# Patient Record
(demographics unavailable — no encounter records)

---

## 2024-10-21 NOTE — HISTORY OF PRESENT ILLNESS
[de-identified] : Ear Pain [FreeTextEntry6] : b/l, started last night. Fever to 101. Does have a runny nose and cough.

## 2024-10-21 NOTE — DISCUSSION/SUMMARY
[FreeTextEntry1] : 5 year F with bilateral AOM requiring antibiotics given degree of discomfort. Discussed importance of completing antibiotic course. Potential side effect of antibiotics includes but not limited to diarrhea. Provide ibuprofen as needed for pain or fever. If no improvement within 48 hours return for re-evaluation. Follow up in 2-4 wks for ear check.

## 2024-11-01 NOTE — PHYSICAL EXAM
[General Appearance - Well Developed] : interactive [General Appearance - Well-Appearing] : well appearing [General Appearance - In No Acute Distress] : in no acute distress [Sclera] : the conjunctiva were normal [Outer Ear] : the ears and nose were normal in appearance [Examination Of The Oral Cavity] : mucous membranes were moist and pink [Normal Appearance] : was normal in appearance [Neck Supple] : was supple [Respiration, Rhythm And Depth] : normal respiratory rhythm and effort [Auscultation Breath Sounds / Voice Sounds] : clear bilateral breath sounds [Heart Rate And Rhythm] : heart rate and rhythm were normal [Heart Sounds] : normal S1 and S2 [Murmurs] : no murmurs [Bowel Sounds] : normal bowel sounds [Abdomen Soft] : soft [Abdomen Tenderness] : non-tender [Abdominal Distention] : nondistended [] : no hepato-splenomegaly [Musculoskeletal Exam: Normal Movement Of All Extremities] : normal movements of all extremities [No Visual Abnormalities] : no visible abnormailities [Motor Tone] : normal muscle strength and tone [Generalized Lymph Node Enlargement] : no lymphadenopathy [Normal] : normal texture and mobility [Initial Inspection: Infant Active And Alert] : active and alert [FreeTextEntry1] : serous effusions b/l [Enlarged Diffusely] : was not enlarged [Abnormal Color] : normal color and pigmentation [Skin Lesions 1] : no skin lesions were observed [Skin Turgor Decreased] : normal skin turgor

## 2024-11-01 NOTE — HISTORY OF PRESENT ILLNESS
[Preoperative Visit] : for a medical evaluation prior to surgery [Prior Anesthesia] : Prior anesthesia [Good] : Good [Fever] : no fever [Runny Nose] : no runny nose [Earache] : no earache [Sore Throat] : no sore throat [Cough] : no cough [Vomiting] : no vomiting [Abdominal Pain] : no abdominal pain [Diarrhea] : no diarrhea [Rash] : no rash [Prev Anesthesia Reaction] : no previous anesthesia reaction [Diabetes] : no diabetes [Pulmonary Disease] : no pulmonary disease [Renal Disease] : no renal disease [GI Disease] : no gastrointestinal disease [Sleep Apnea] : no sleep apnea [Transfusion Reaction] : no transfusion reaction [Impaired Immunity] : no impaired immunity [Frequent use of NSAIDs] : no use of NSAIDs [Anesthesia Reaction] : no anesthesia reaction [Clotting Disorder] : no clotting disorder [Bleeding Disorder] : no bleeding disorder [Sudden Death] : no sudden death [FreeTextEntry2] : 11/6/24 [de-identified] : Dr. Pederson [FreeTextEntry1] : Seen 1.5 weeks ago for AOM. Completed amox, back to normal now. No recent illness.

## 2024-11-11 NOTE — PHYSICAL EXAM
[Clear Rhinorrhea] : clear rhinorrhea [Bleeding] : no bleeding [NL] : warm, clear [FreeTextEntry3] : L TM obscured by crusted blood. R TM appears wnl with tube intact. [FreeTextEntry7] : rhonchi throughout. frequent dry cough. no tachypnea.

## 2024-11-11 NOTE — HISTORY OF PRESENT ILLNESS
Problem: Safety  Goal: Will remain free from injury  Outcome: PROGRESSING AS EXPECTED   Safety precautions in place.   Problem: Pain Management  Goal: Pain level will decrease to patient's comfort goal  Outcome: PROGRESSING AS EXPECTED  Pt pain is managed at him comfort level.       [de-identified] : Cough and Fever [FreeTextEntry6] : x 2-3 days. Runny nose. Appetite is okay. Not feeling well, took a 1.5 hour nap yesterday which isn't like her. Droopy eyes. Cough is dry, not interrupting her sleep. No labored breathing. Tmax in the 100s. POD 5 from adenoidectomy and BMT. No ear discharge. I have reviewed and agree with all pertinent clinical information, including history and physical exam and agree with treatment plan of the PA.

## 2024-11-11 NOTE — DISCUSSION/SUMMARY
[FreeTextEntry1] : 4 yo F with cough, fever, and rhonchi on exam c/w mycoplasma infection. Current high rate of community spread. Doubt post-op related, as illness very c/w mycoplasma. Treat with azithromycin and OTC cough meds. RTC if not improved in 2-3 days, worsening or other new symptoms.  Nasal swabs deferred as POD 5 from adenoidectomy.

## 2024-11-14 NOTE — PHYSICAL EXAM
[NL] : warm, clear [FreeTextEntry1] : higher energy than earlier this week [FreeTextEntry7] : scattered rhonchi, overall improved from earlier this week. scattered end-exp wheezing. frequent dry cough, worse with taking deep breaths. good a/e.

## 2024-11-14 NOTE — DISCUSSION/SUMMARY
[FreeTextEntry1] : 4 yo F with presumed mycoplasma bronchitis. Still febrile but overall improving on azithromycin. Given wheezing, tried a dose of albuterol in office; wheezing resolved, and Pia was able to take deeper breaths and was coughing less. Rec using q4h prn for her cough. If fever not resolved in another 2 days or if anything is worsening would consider adding on amox.  Provided spacer for MDI in the office and demo'ed how to use it.

## 2025-01-03 NOTE — DISCUSSION/SUMMARY
[] : The components of the vaccine(s) to be administered today are listed in the plan of care. The disease(s) for which the vaccine(s) are intended to prevent and the risks have been discussed with the caretaker.  The risks are also included in the appropriate vaccination information statements which have been provided to the patient's caregiver.  The caregiver has given consent to vaccinate. [FreeTextEntry1] :  Well 6 year F  Continue balanced diet with all food groups. Brush teeth twice a day with toothbrush. Recommend visit to dentist. Help child to maintain consistent daily routines and sleep schedule. School discussed. Ensure home is safe. Teach child about personal safety. Use consistent, positive discipline. Limit screen time to no more than 2 hours per day. Encourage physical activity. Child needs to ride in a belt-positioning booster seat until  4 feet 9 inches has been reached and are between 8 and 12 years of age.   -Imms: Flu -Labs: None needed -Good interval growth. -Next well visit in 1 year.

## 2025-01-03 NOTE — HISTORY OF PRESENT ILLNESS
[Father] : father [Fruit] : fruit [Vegetables] : vegetables [Meat] : meat [Grains] : grains [Eggs] : eggs [Fish] : fish [Dairy] : dairy [Normal] : Normal [In own bed] : In own bed [Brushing teeth] : Brushing teeth [Yes] : Patient goes to dentist yearly [Toothpaste] : Primary Fluoride Source: Toothpaste [Playtime (60 min/d)] : Playtime 60 min a day [Appropiate parent-child-sibling interaction] : Appropriate parent-child-sibling interaction [Parent has appropriate responses to behavior] : Parent has appropriate responses to behavior [Grade ___] : Grade [unfilled] [No difficulties with Homework] : No difficulties with homework [Adequate performance] : Adequate performance [Adequate attention] : Adequate attention [Child Cooperates] : Child cooperates [FreeTextEntry7] : Cough resolved [de-identified] : None [de-identified] : somewhat picky [de-identified] : PS 78

## 2025-03-31 NOTE — HISTORY OF PRESENT ILLNESS
[de-identified] : Arline is a 5 y/o here for follow up s/p BMT adenoids 11/6/25  S/p tonsillectomy and adenoidectomy in June 2021- severe POORNIMA (AHI 28.1/hr) No recent ear infections  No otorrhea  Occasional right ear itching Hearing improved No chronic nasal congestion  No snoring

## 2025-03-31 NOTE — REASON FOR VISIT
[Subsequent Evaluation] : a subsequent evaluation for [Mother] : mother [Hearing Loss] : hearing loss [FreeTextEntry2] : ETD

## 2025-03-31 NOTE — CONSULT LETTER
[Dear  ___] : Dear  [unfilled], [Courtesy Letter:] : I had the pleasure of seeing your patient, [unfilled], in my office today. [Please see my note below.] : Please see my note below. [Consult Closing:] : Thank you very much for allowing me to participate in the care of this patient.  If you have any questions, please do not hesitate to contact me. [Sincerely,] : Sincerely, [FreeTextEntry2] : Dr. Rose Villalobos [address] [place] [address]   [phone] [FreeTextEntry3] : Kong Pederson MD Chief, Pediatric Otolaryngology [facility] and Arline Alves [facility] Professor of Otolaryngology [facility] [employer] at [facility]

## 2025-03-31 NOTE — PHYSICAL EXAM
[Clear/Ventilated] : middle ear clear and well ventilated [Surgically Absent] : surgically absent [Increased Work of Breathing] : no increased work of breathing with use of accessory muscles and retractions [Normal Gait and Station] : normal gait and station [Normal muscle strength, symmetry and tone of facial, head and neck musculature] : normal muscle strength, symmetry and tone of facial, head and neck musculature [Normal] : no cervical lymphadenopathy [de-identified] : PET clogged

## 2025-03-31 NOTE — DATA REVIEWED
[FreeTextEntry1] : Audiogram 3-: Hearing within normal limits right ear. Left ear slight HL versus borderline normal.

## 2025-04-30 NOTE — DISCUSSION/SUMMARY
[FreeTextEntry1] : --- Arline is a 6 year old female with seasonal allergic rhinitis and now allergic conjunctivitis and post-nasal drip.  - Discussed OTC antihistamines: Zyrtec, Flonase, and Zaditor (or Alaway or Pataday). - Discussed strategies for reducing allergen load at home. - Discussed return precautions. - Father acknowledged understanding plan and demonstrated return.

## 2025-04-30 NOTE — HISTORY OF PRESENT ILLNESS
[de-identified] : seasonal allergies [FreeTextEntry6] : Father reports that Arline has been having very bad seasonal allergies this year, especially for the past 4-5 days. He reports she has been having lots of nasal congestion, rhinorrhea, sneezing, itchy eyes. He notes having given her OTC claritin, but switched to Zyrtec 1-2 days ago. He also notes trying Zaditor eye drops this morning with a small improvement in redness. Afebrile. Eating/drinking well.

## 2025-04-30 NOTE — PHYSICAL EXAM
[Alert] : alert [EOMI] : grossly EOMI [Conjuctival Injection] : conjunctival injection [Eyelid Swelling] : eyelid swelling [Allergic Shiners] : allergic shiners [Clear Rhinorrhea] : clear rhinorrhea [Inflamed Nasal Mucosa] : inflamed nasal mucosa [Cobblestoning] : cobblestoning of posterior pharynx [Clear to Auscultation Bilaterally] : clear to auscultation bilaterally [Regular Rate and Rhythm] : regular rate and rhythm [Normal S1, S2 audible] : normal S1, S2 audible [Warm] : warm [Clear] : clear [Acute Distress] : no acute distress [Tired appearing] : not tired appearing [Discharge] : no discharge [Wheezing] : no wheezing [Rales] : no rales [Crackles] : no crackles [Transmitted Upper Airway Sounds] : no transmitted upper airway sounds [Tachypnea] : no tachypnea [Rhonchi] : no rhonchi [Belly Breathing] : no belly breathing [Subcostal Retractions] : no subcostal retractions [Suprasternal Retractions] : no suprasternal retractions

## 2025-05-22 NOTE — REASON FOR VISIT
[Initial Consultation] : an initial consultation for [Father] : father [FreeTextEntry3] : allergies

## 2025-05-22 NOTE — HISTORY OF PRESENT ILLNESS
[de-identified] : Patient with sneezing - puffy eyes - itchy eyes - stuffy nose - mild coughing - no associated wheezing or SOB - this season has been much worse - milder in the past - treated with Zyrtec - Zaditor eye drops - Flonase BID.      Father reports use of albuterol - not given this spring - father is not certain.     The remainder of the year she does well.

## 2025-05-22 NOTE — PHYSICAL EXAM
[Alert] : alert [Well Nourished] : well nourished [No Acute Distress] : no acute distress [Well Developed] : well developed [Normal Voice/Communication] : normal voice communication [Conjunctival Erythema] : no conjunctival erythema [Normal Nasal Mucosa] : the nasal mucosa was normal [No Neck Mass] : no neck mass was observed [No LAD] : no lymphadenopathy [Normal Rate and Effort] : normal respiratory rhythm and effort [No Crackles] : no crackles [Normal Rate] : heart rate was normal  [Normal S1, S2] : normal S1 and S2 [Regular Rhythm] : with a regular rhythm [Normal Cervical Lymph Nodes] : cervical [No Rash] : no rash [Normal Mood] : mood was normal [Judgment and Insight Age Appropriate] : judgement and insight is age appropriate

## 2025-05-22 NOTE — BIRTH HISTORY
[Normal Vaginal Route] : by normal vaginal route [Prematurity at ___ weeks gestation] : Patient was born at term [Age Appropriate] : Age appropriate developmental milestones not met

## 2025-05-22 NOTE — SOCIAL HISTORY
[Apartment] : [unfilled] lives in an apartment  [Central Forced Air] : heating provided by central forced air [Central] : air conditioning provided by central unit [None] : none [Single] : single [FreeTextEntry1] : Lives with parents - 1 brother  Grade 1st  [Bedroom] : not in the bedroom [Living Area] : not in the living area [Smokers in Household] : there are no smokers in the home

## 2025-07-11 NOTE — DISCUSSION/SUMMARY
[FreeTextEntry1] : 6 year F with otitis externa. Rec ear drops prescribed to reduce pain and swelling caused by external otitis. Side effect of drops include but not limited to worsening sensitivity of ear canal. It is important to apply the ear drops correctly so that they reach the ear canal: -Lie on patient side or tilt head towards the opposite shoulder. -Place the ear drops in the ear canal. -Lie on side for 20 minutes or place a cotton ball in the ear canal for 20 minutes.  During treatment, avoid getting the inside of ears wet. Do not swim for 7 to 10 days after starting treatment. Avoid wearing hearing aids and in-ear headphones until pain improves. Discussed drying ears immediately after swimming to help prevent future episodes. If not improving in 2-3 days or worsening should return to care.

## 2025-07-11 NOTE — PHYSICAL EXAM
[NL] : warm, clear [FreeTextEntry3] : L canal with copious white discharge, unable to visualize TM. R canal without discharge but with extruded ear tube mixed with cerumen.

## 2025-07-11 NOTE — HISTORY OF PRESENT ILLNESS
[de-identified] : R Ear Pain [FreeTextEntry6] : Since this morning. No fever. Has had some drainage from the ear. Swimming daily at camp. No recent runny nose or cough.

## 2025-07-14 NOTE — ASSESSMENT
[FreeTextEntry1] : Seasonal allergic rhinoconjunctivitis:  No medications needed at this time  RV 3/26 for medications to be started before the onset of symptoms.

## 2025-07-26 NOTE — DISCUSSION/SUMMARY
[FreeTextEntry1] : Insect bites, possibly sand flies. Rec hydrocortisone bid prn, Zyrtec prn to help with symptomatic relief. No spots that look superinfected but if develops purulent drainage would use topical antibiotic ointment. RTC if fever or other new symptoms.

## 2025-07-26 NOTE — PHYSICAL EXAM
[NL] : left tympanic membrane clear, right tympanic membrane clear [de-identified] : healing wheals with central puncta on arms, abdomen, and legs. some excoriations. serous drainage from some but no purulence. no surrounding erythema.

## 2025-07-26 NOTE — HISTORY OF PRESENT ILLNESS
[de-identified] : Bug Bites [FreeTextEntry6] : [place] to the beach with her grandparents and always returns covered in bites. Camp called yesterday because she was saying one of them hurt. Parents traveling next week so want to make sure they look okay before going. No fever. Using hydrocortisone prn to help with the itching.